# Patient Record
Sex: FEMALE | Race: WHITE | Employment: FULL TIME | URBAN - METROPOLITAN AREA
[De-identification: names, ages, dates, MRNs, and addresses within clinical notes are randomized per-mention and may not be internally consistent; named-entity substitution may affect disease eponyms.]

---

## 2017-01-26 ENCOUNTER — ANESTHESIA EVENT (OUTPATIENT)
Dept: SURGERY | Facility: HOSPITAL | Age: 67
End: 2017-01-26

## 2017-01-26 ENCOUNTER — ANESTHESIA (OUTPATIENT)
Dept: SURGERY | Facility: HOSPITAL | Age: 67
End: 2017-01-26

## 2017-01-26 ENCOUNTER — SURGERY (OUTPATIENT)
Age: 67
End: 2017-01-26

## 2017-01-26 NOTE — ANESTHESIA PREPROCEDURE EVALUATION
PRE-OP EVALUATION    Patient Name: Deborra Semen    Pre-op Diagnosis: ENDOMETRIAL POLYP    Procedure(s): HYSTEROSCOPY DILATION AND CURETTAGE , POLYPECTOMY    Surgeon(s) and Role:     Shalom Paul MD - Primary    Pre-op vitals reviewed.   Tem Pulmonary    Pulmonary exam normal.  Breath sounds clear to auscultation bilaterally. Other findings            ASA: 2   Plan: general and MAC  NPO status verified and patient meets guidelines.     Post-procedure pain management plan discussed

## 2017-01-26 NOTE — ANESTHESIA POSTPROCEDURE EVALUATION
[address] [facility] Patient Status:  Hospital Outpatient Surgery   Age/Gender 77year old female MRN [id number]   [facility] SURGERY Attending Angélica Hugo MD   Hosp Day # 0 PCP Jack Crisostomo PA-C       Anesthesia P

## 2021-04-12 DIAGNOSIS — Z23 NEED FOR VACCINATION: ICD-10-CM
